# Patient Record
(demographics unavailable — no encounter records)

---

## 2025-04-28 NOTE — ASSESSMENT
[FreeTextEntry1] :  The patient is being seen today for bl hand pain and numbness. She has cramping in her right hand and difficulty using both of her hands. She also has a cyst on the distal aspect of her right middle finger. She used to wear braces at night but has stopped due to the discomfort they cause.   Neck: +spurling  BL hand:  Tender volar wrist  Good finger ROM  +Tinels  +Phalens  +Compression test  Decreased sensation median nerve distribution -thenar atrophy  RMF: mass in the pulp, non ttp, neurovascular intact  The patient was advised of the diagnosis.  The natural history of the pathology was explained in full to the patient in layman's terms. We discussed the nature of the nerve as an electrical cable and what happens to the nerve in carpal tunnel syndrome.  We discussed that treatment for night symptoms included night bracing.  We discussed the possibility of injection when symptoms were intermittent or in patients who were unwilling to undergo surgery with constant symptoms.  We discussed that injection is a diagnostic and therapeutic aide and what this means.  We discussed the use of nerve testing in cases when diagnosis was in doubt or for confirmation to exclude alternate pathology.  We discussed that if symptoms were 24/7 surgery was recommended to give the nerve the best chance to recover but that once symptoms were constant, the nerve may not recover even with surgery.  We discussed that if left alone the nerve progression could worsen and that treatment was indicated to prevent progression of nerve compression.  The longer the nerve is left, the more likely to cause worsening irreversible damage.  All questions were answered.  The risks and benefits of surgical and non-surgical treatment alternatives were explained in full to the patient. We discussed the likelihood of "double crush syndrome," i.e. compression of the nerve at the neck and the wrist. She will have an EMG done to evaluate the severity of the carpal tunnel syndrome. She will follow up after the EMG.  She had a mass on the distal aspect of her right middle finger. We discussed that it does not require treatment. We will treat it based on her symptoms.